# Patient Record
Sex: MALE | URBAN - METROPOLITAN AREA
[De-identification: names, ages, dates, MRNs, and addresses within clinical notes are randomized per-mention and may not be internally consistent; named-entity substitution may affect disease eponyms.]

---

## 2024-05-02 NOTE — PROGRESS NOTES
[facility] [place]     CC: yearly exam     HPI: Kyler Donato is 43 year old male here for a yearly physical.    1.  Mild inattentive ADHD, new diagnosis. He also has some concern about substance use with alcohol. He notes that his son was dx with ADHD and this made him concerned he had ADHD too. He notes that his job is demanding, he works for [employer] in a [employer]. He reports that he previously drank alcohol 6 days per week but he has cut back and has not had any alcohol in past month. He reports that as he was going thorugh his divorce 3 months ago he was drinking 2-3 beverages per night 4 days per week.. He has spoken to [facility] about coping strategies.  He was diagnosed with mild ADHD inattentive type.  He is following with a therapist currently. He sometimes uses marijuana to help calm his mind.    2. Healthcare maintenance.  Exercise - goes to the gym and does floor exercises at home.  Sunscreen -.  Caffeine - 4 cups of coffee per day.  Advanced directives- not yet     PMH:  There is no problem list on file for this patient.    Last Physical exam 5/3/24     SH: reviewed     FH: reviewed     Social History     Social History Narrative    Works for [employer]. Recently  in 2024.         ROS: full 10 point review of systems done and otherwise negative.  Denies any headaches, vision changes, congestion, sore throat, tinnitus, dizziness, cough, SOB, CP, palpitations, difficulty swallowing, n/v, c/d, blood in stools, urinary symptoms, LE edema, numbness/tingling in toes, focal weakness, joint pains, cold/heat intolerance or skin changes.     Healthcare Maintenance:  Health Maintenance   Topic Date Due    Annual Physical  Never done    DTaP,Tdap,and Td Vaccines (1 - Tdap) Never done    COVID-19 Vaccine (1 - 2023-24 season) Never done    Annual Depression Screening  Never done    Influenza Vaccine (Season Ended) 10/01/2024    Pneumococcal Vaccine: Birth to 64yrs  Aged Out        Health Habits:  Smoking. 1-2 cigarettes per week  Alcohol Use. 2-3 beverages per night, sometimes will take a month off  Dental Exam. UTD  Eye Exam. UTD     PE:  Vital Signs    05/03/24 0919   BP: 136/80   Pulse: 90   Resp: 16   Temp: 97 °F (36.1 °C)     Wt Readings from Last 3 Encounters:   05/03/24 193 lb (87.5 kg)     Body mass index is 29.35 kg/m².     General: Comfortable, pleasant, NAD, appears stated age  HEENT.  NC/AT, no conjunctival injection, TMs clear, oropharynx without erythema or exudate, neck supple, no LAD or thyromegaly  CV.  RRR, no m/r/g  Pulm. CTAB, no W/R/R, comfortable work of breathing, speaks in full sentences  Abdomen. Soft, nt, nd  .  deferred  Extremities.   no edema  Skin.  No concerning moles       No visits with results within 4 Week(s) from this visit.   Latest known visit with results is:   Lab Requisition on 01/18/2019   Component Date Value Ref Range Status    Glucose 01/18/2019 88  70 - 99 mg/dL Final    Sodium 01/18/2019 136  136 - 144 mmol/L Final    Potassium 01/18/2019 4.4  3.3 - 5.1 mmol/L Final    Chloride 01/18/2019 98  95 - 110 mmol/L Final    CO2 01/18/2019 24  22 - 32 mmol/L Final    BUN 01/18/2019 8  8 - 20 mg/dL Final    Creatinine 01/18/2019 0.93  0.50 - 1.50 mg/dL Final    Calcium, Total 01/18/2019 9.7  8.5 - 10.5 mg/dL Final    ALT 01/18/2019 18  17 - 63 U/L Final    AST 01/18/2019 22  15 - 41 U/L Final    Alkaline Phosphatase 01/18/2019 58  32 - 100 U/L Final    Bilirubin, Total 01/18/2019 0.6  0.3 - 1.2 mg/dL Final    Total Protein 01/18/2019 7.7  5.9 - 8.4 g/dL Final    Albumin 01/18/2019 4.3  3.5 - 4.8 g/dL Final    Globulin 01/18/2019 3.4  2.5 - 3.7 g/dL Final    A/G Ratio 01/18/2019 1.3  1.0 - 2.0 Final    Anion Gap 01/18/2019 14  0 - 18 mmol/L Final    BUN/CREA Ratio 01/18/2019 8.6 (L)  10.0 - 20.0 Final    Calculated Osmolality 01/18/2019 280  275 - 295 mOsm/kg Final    GFR, Non- 01/18/2019 >60  >=60 Final    GFR, -American  01/18/2019 >60  >=60 Final    HDL Cholesterol 01/18/2019 37  mg/dL Final    Cholesterol, Total 01/18/2019 123  110 - 200 mg/dL Final    Triglycerides 01/18/2019 60  1 - 149 mg/dL Final    Non HDL Chol 01/18/2019 86  <130 mg/dL Final    LDL Cholesterol 01/18/2019 74  0 - 99 mg/dL Final    WBC 01/18/2019 5.3  4.0 - 11.0 K/UL Final    RBC 01/18/2019 4.93  4.50 - 5.90 M/UL Final    HGB 01/18/2019 15.6  13.5 - 17.5 g/dL Final    HCT 01/18/2019 45.2  41.0 - 52.0 % Final    MCV 01/18/2019 91.6  80.0 - 100.0 fL Final    MCH 01/18/2019 31.7  27.0 - 32.0 pg Final    MCHC 01/18/2019 34.6  32.0 - 37.0 g/dl Final    RDW 01/18/2019 13.4  11.0 - 15.0 % Final    PLT 01/18/2019 226  140 - 400 K/UL Final    MPV 01/18/2019 7.2 (L)  7.4 - 10.3 fL Final    Neutrophil % 01/18/2019 56  % Final    Lymphocyte % 01/18/2019 31  % Final    Monocyte % 01/18/2019 10  % Final    Eosinophil % 01/18/2019 2  % Final    Basophil % 01/18/2019 1  % Final    Neutrophil Absolute 01/18/2019 3.0  1.8 - 7.7 K/UL Final    Lymphocyte Absolute 01/18/2019 1.6  1.0 - 4.0 K/UL Final    Monocyte Absolute 01/18/2019 0.5  0.0 - 1.0 K/UL Final    Eosinophil Absolute 01/18/2019 0.1  0.0 - 0.7 K/UL Final    Basophil Absolute 01/18/2019 0.1  0.0 - 0.2 K/UL Final        A/P: Kyler Kinga is 43 year old yo male   1. ADHD. Discussed coping strategies with the patient and he feels he is implementing these. He is also working with his therapist on coping strategies. Disucssed treatment options. D/t concerns about substance use I feel a non stimulant medication would be best. Strattera 40 mg daily started. Follow up in 1 month to evaluate how is doing with this medication. Risks/benefits common and serious s/e advised.  2. Healthcare Maintenance. Discussed eye exam, dentist visit q6 months, sunscreen, exercise at least 5x/week, 30 minutes daily, and limiting caffeine intake. Basic labs ordered.    Outpatient Encounter Medications as of 5/3/2024   Medication Sig Dispense  Refill    Ascorbic Acid (VITAMIN C OR) Take by mouth.      atomoxetine (STRATTERA) 40 MG Oral Cap Take 1 capsule (40 mg total) by mouth daily. 30 capsule 0     No facility-administered encounter medications on file as of 5/3/2024.

## 2024-05-02 NOTE — PATIENT INSTRUCTIONS
It was a pleasure seeing you today in our clinic.     If you have any questions about what we discussed today, please call our office at [phone].    Be well,   Dr. Brady

## 2024-05-03 ENCOUNTER — OFFICE VISIT (OUTPATIENT)
Dept: FAMILY MEDICINE CLINIC | Facility: CLINIC | Age: 44
End: 2024-05-03
Payer: COMMERCIAL

## 2024-05-03 ENCOUNTER — PATIENT MESSAGE (OUTPATIENT)
Dept: FAMILY MEDICINE CLINIC | Facility: CLINIC | Age: 44
End: 2024-05-03

## 2024-05-03 VITALS
TEMPERATURE: 97 F | HEIGHT: 68 IN | RESPIRATION RATE: 16 BRPM | WEIGHT: 193 LBS | DIASTOLIC BLOOD PRESSURE: 80 MMHG | HEART RATE: 90 BPM | BODY MASS INDEX: 29.25 KG/M2 | SYSTOLIC BLOOD PRESSURE: 136 MMHG

## 2024-05-03 DIAGNOSIS — Z23 NEED FOR VACCINATION: ICD-10-CM

## 2024-05-03 DIAGNOSIS — Z00.00 ENCOUNTER FOR ROUTINE HISTORY AND PHYSICAL EXAMINATION: Primary | ICD-10-CM

## 2024-05-03 DIAGNOSIS — Z13.6 SCREENING FOR CARDIOVASCULAR CONDITION: ICD-10-CM

## 2024-05-03 DIAGNOSIS — F90.0 ADHD (ATTENTION DEFICIT HYPERACTIVITY DISORDER), INATTENTIVE TYPE: ICD-10-CM

## 2024-05-03 PROCEDURE — 99386 PREV VISIT NEW AGE 40-64: CPT | Performed by: STUDENT IN AN ORGANIZED HEALTH CARE EDUCATION/TRAINING PROGRAM

## 2024-05-03 PROCEDURE — 90471 IMMUNIZATION ADMIN: CPT | Performed by: STUDENT IN AN ORGANIZED HEALTH CARE EDUCATION/TRAINING PROGRAM

## 2024-05-03 PROCEDURE — 99204 OFFICE O/P NEW MOD 45 MIN: CPT | Performed by: STUDENT IN AN ORGANIZED HEALTH CARE EDUCATION/TRAINING PROGRAM

## 2024-05-03 PROCEDURE — 90715 TDAP VACCINE 7 YRS/> IM: CPT | Performed by: STUDENT IN AN ORGANIZED HEALTH CARE EDUCATION/TRAINING PROGRAM

## 2024-05-03 RX ORDER — ATOMOXETINE 40 MG/1
40 CAPSULE ORAL DAILY
Qty: 30 CAPSULE | Refills: 0 | Status: SHIPPED | OUTPATIENT
Start: 2024-05-03 | End: 2024-06-02

## 2024-05-03 NOTE — TELEPHONE ENCOUNTER
From: Kyler Donato  To: Rashmi Brady  Sent: 5/3/2024 6:43 AM CDT  Subject: ADHD Diagnosis    I was not sure how to upload this to my file

## 2024-05-30 DIAGNOSIS — F90.0 ADHD (ATTENTION DEFICIT HYPERACTIVITY DISORDER), INATTENTIVE TYPE: ICD-10-CM

## 2024-05-30 NOTE — TELEPHONE ENCOUNTER
Refill request for:    Requested Prescriptions     Pending Prescriptions Disp Refills    ATOMOXETINE 40 MG Oral Cap [Pharmacy Med Name: ATOMOXETINE HCL 40 MG CAPSULE] 30 capsule 0     Sig: TAKE 1 CAPSULE (40 MG TOTAL) BY MOUTH DAILY.        Last Prescribed Quantity Refills   5/3/24 30 0     LOV 5/3/2024     Patient was asked to follow-up in: Follow-up not documented in note    Appointment scheduled: 6/3/2024 Rashmi Brady MD    Medication not on protocol.     # 30 with 0 refills routed to Rashmi Brady MD for review

## 2024-05-31 RX ORDER — ATOMOXETINE 40 MG/1
40 CAPSULE ORAL DAILY
Qty: 30 CAPSULE | Refills: 0 | Status: SHIPPED | OUTPATIENT
Start: 2024-05-31 | End: 2024-06-03

## 2024-06-09 ENCOUNTER — PATIENT MESSAGE (OUTPATIENT)
Dept: FAMILY MEDICINE CLINIC | Facility: CLINIC | Age: 44
End: 2024-06-09

## 2024-06-09 DIAGNOSIS — F90.0 ADHD (ATTENTION DEFICIT HYPERACTIVITY DISORDER), INATTENTIVE TYPE: ICD-10-CM

## 2024-06-10 NOTE — TELEPHONE ENCOUNTER
From: Kyler Donato  To: Rashmi Brady  Sent: 6/9/2024 4:45 PM CDT  Subject: Medication    As i put my pills in the pill box I realized there is a subtle issue with an upcoming vacation. I am leaving to [place] from June 27th through July 9th. I am not 100% if I can easily get a refill in [place] or not yet. Do I need to figure that out or can get what I need June 26th from my local [facility]?

## 2024-06-11 RX ORDER — ATOMOXETINE 80 MG/1
80 CAPSULE ORAL
Qty: 30 CAPSULE | Refills: 0 | Status: SHIPPED | OUTPATIENT
Start: 2024-06-11 | End: 2024-07-11

## 2024-06-11 NOTE — TELEPHONE ENCOUNTER
Jose Antonio inbox- he should get prescription here. It just comes down to being an insurance issue to fill early. Not a controlled substance so I did put in refill and can discuss with his pharmacy.

## 2024-06-11 NOTE — TELEPHONE ENCOUNTER
Spoke with Mariah at [facility]- they will put a note in system - okay to refill early on 6/26/24. Pt may need to pay out of pocket for med.  Pt will need to contact [facility] couple days before to get refill started.    Left message for Pt to call back

## 2024-08-12 DIAGNOSIS — F90.0 ADHD (ATTENTION DEFICIT HYPERACTIVITY DISORDER), INATTENTIVE TYPE: ICD-10-CM

## 2024-08-12 NOTE — TELEPHONE ENCOUNTER
Refill request for:    Requested Prescriptions     Pending Prescriptions Disp Refills    ATOMOXETINE  MG Oral Cap [Pharmacy Med Name: ATOMOXETINE  MG CAPSULE] 30 capsule 0     Sig: TAKE 1 CAPSULE (100 MG TOTAL) BY MOUTH DAILY WITH DINNER.        LOV 7/12/2024     Patient was asked to follow-up in: Follow-up not documented in note    Appointment scheduled: 9/13/2024 Rashmi Brady MD    Medication not on protocol.     # 30 with 0 refills routed to Rashmi Brady MD for review

## 2024-08-14 RX ORDER — ATOMOXETINE 100 MG/1
100 CAPSULE ORAL
Qty: 30 CAPSULE | Refills: 0 | Status: SHIPPED | OUTPATIENT
Start: 2024-08-14 | End: 2024-09-13

## 2024-09-12 DIAGNOSIS — F90.0 ADHD (ATTENTION DEFICIT HYPERACTIVITY DISORDER), INATTENTIVE TYPE: ICD-10-CM

## 2024-09-16 RX ORDER — ATOMOXETINE 100 MG/1
100 CAPSULE ORAL
Qty: 30 CAPSULE | Refills: 0 | OUTPATIENT
Start: 2024-09-16 | End: 2024-10-16

## 2024-09-16 NOTE — TELEPHONE ENCOUNTER
Refill request for:    Requested Prescriptions     Pending Prescriptions Disp Refills    ATOMOXETINE  MG Oral Cap [Pharmacy Med Name: ATOMOXETINE  MG CAPSULE] 30 capsule 0     Sig: TAKE 1 CAPSULE (100 MG TOTAL) BY MOUTH DAILY WITH DINNER.        9/13/24    Patient was asked to follow-up in: Follow-up not documented in note      Appointment scheduled: 9/13/2024 Rashmi Brady MD    Medication not on protocol.     # 30 with 0 refills routed to Rashmi Brady MD for review

## 2024-09-18 ENCOUNTER — TELEPHONE (OUTPATIENT)
Age: 44
End: 2024-09-18

## 2024-09-20 ENCOUNTER — TELEPHONE (OUTPATIENT)
Age: 44
End: 2024-09-20

## 2024-09-24 ENCOUNTER — TELEPHONE (OUTPATIENT)
Age: 44
End: 2024-09-24

## 2024-10-25 ENCOUNTER — LAB ENCOUNTER (OUTPATIENT)
Dept: LAB | Age: 44
End: 2024-10-25
Attending: STUDENT IN AN ORGANIZED HEALTH CARE EDUCATION/TRAINING PROGRAM
Payer: COMMERCIAL

## 2024-10-25 DIAGNOSIS — Z00.00 ENCOUNTER FOR ROUTINE HISTORY AND PHYSICAL EXAMINATION: ICD-10-CM

## 2024-10-25 DIAGNOSIS — Z13.6 SCREENING FOR CARDIOVASCULAR CONDITION: ICD-10-CM

## 2024-10-25 DIAGNOSIS — F90.0 ADHD (ATTENTION DEFICIT HYPERACTIVITY DISORDER), INATTENTIVE TYPE: ICD-10-CM

## 2024-10-25 LAB
ALBUMIN SERPL-MCNC: 4.7 G/DL (ref 3.2–4.8)
ALBUMIN/GLOB SERPL: 1.4 {RATIO} (ref 1–2)
ALP LIVER SERPL-CCNC: 59 U/L
ALT SERPL-CCNC: 17 U/L
ANION GAP SERPL CALC-SCNC: 4 MMOL/L (ref 0–18)
AST SERPL-CCNC: 26 U/L (ref ?–34)
BASOPHILS # BLD AUTO: 0.07 X10(3) UL (ref 0–0.2)
BASOPHILS NFR BLD AUTO: 1.5 %
BILIRUB SERPL-MCNC: 0.7 MG/DL (ref 0.3–1.2)
BUN BLD-MCNC: 15 MG/DL (ref 9–23)
CALCIUM BLD-MCNC: 10.7 MG/DL (ref 8.7–10.4)
CHLORIDE SERPL-SCNC: 106 MMOL/L (ref 98–112)
CHOLEST SERPL-MCNC: 148 MG/DL (ref ?–200)
CO2 SERPL-SCNC: 28 MMOL/L (ref 21–32)
CREAT BLD-MCNC: 0.96 MG/DL
EGFRCR SERPLBLD CKD-EPI 2021: 101 ML/MIN/1.73M2 (ref 60–?)
EOSINOPHIL # BLD AUTO: 0.11 X10(3) UL (ref 0–0.7)
EOSINOPHIL NFR BLD AUTO: 2.4 %
ERYTHROCYTE [DISTWIDTH] IN BLOOD BY AUTOMATED COUNT: 12.7 %
FASTING PATIENT LIPID ANSWER: YES
FASTING STATUS PATIENT QL REPORTED: YES
GLOBULIN PLAS-MCNC: 3.4 G/DL (ref 2–3.5)
GLUCOSE BLD-MCNC: 97 MG/DL (ref 70–99)
HCT VFR BLD AUTO: 47.4 %
HDLC SERPL-MCNC: 47 MG/DL (ref 40–59)
HGB BLD-MCNC: 16.5 G/DL
IMM GRANULOCYTES # BLD AUTO: 0.04 X10(3) UL (ref 0–1)
IMM GRANULOCYTES NFR BLD: 0.9 %
LDLC SERPL CALC-MCNC: 90 MG/DL (ref ?–100)
LYMPHOCYTES # BLD AUTO: 1.66 X10(3) UL (ref 1–4)
LYMPHOCYTES NFR BLD AUTO: 35.5 %
MCH RBC QN AUTO: 32.4 PG (ref 26–34)
MCHC RBC AUTO-ENTMCNC: 34.8 G/DL (ref 31–37)
MCV RBC AUTO: 92.9 FL
MONOCYTES # BLD AUTO: 0.47 X10(3) UL (ref 0.1–1)
MONOCYTES NFR BLD AUTO: 10.1 %
NEUTROPHILS # BLD AUTO: 2.32 X10 (3) UL (ref 1.5–7.7)
NEUTROPHILS # BLD AUTO: 2.32 X10(3) UL (ref 1.5–7.7)
NEUTROPHILS NFR BLD AUTO: 49.6 %
NONHDLC SERPL-MCNC: 101 MG/DL (ref ?–130)
OSMOLALITY SERPL CALC.SUM OF ELEC: 287 MOSM/KG (ref 275–295)
PLATELET # BLD AUTO: 219 10(3)UL (ref 150–450)
POTASSIUM SERPL-SCNC: 5.3 MMOL/L (ref 3.5–5.1)
PROT SERPL-MCNC: 8.1 G/DL (ref 5.7–8.2)
RBC # BLD AUTO: 5.1 X10(6)UL
SODIUM SERPL-SCNC: 138 MMOL/L (ref 136–145)
TRIGL SERPL-MCNC: 54 MG/DL (ref 30–149)
TSI SER-ACNC: 1.17 MIU/ML (ref 0.55–4.78)
VLDLC SERPL CALC-MCNC: 9 MG/DL (ref 0–30)
WBC # BLD AUTO: 4.7 X10(3) UL (ref 4–11)

## 2024-10-25 PROCEDURE — 36415 COLL VENOUS BLD VENIPUNCTURE: CPT

## 2024-10-25 PROCEDURE — 80053 COMPREHEN METABOLIC PANEL: CPT

## 2024-10-25 PROCEDURE — 85025 COMPLETE CBC W/AUTO DIFF WBC: CPT

## 2024-10-25 PROCEDURE — 84443 ASSAY THYROID STIM HORMONE: CPT

## 2024-10-25 PROCEDURE — 80061 LIPID PANEL: CPT

## 2024-10-28 DIAGNOSIS — E87.5 HYPERKALEMIA: Primary | ICD-10-CM
